# Patient Record
Sex: MALE | Race: WHITE | NOT HISPANIC OR LATINO | Employment: OTHER | URBAN - NONMETROPOLITAN AREA
[De-identification: names, ages, dates, MRNs, and addresses within clinical notes are randomized per-mention and may not be internally consistent; named-entity substitution may affect disease eponyms.]

---

## 2017-01-23 ENCOUNTER — OFFICE VISIT (OUTPATIENT)
Dept: OPHTHALMOLOGY | Facility: CLINIC | Age: 82
End: 2017-01-23

## 2017-01-23 DIAGNOSIS — H35.3190 NONEXUDATIVE AGE-RELATED MACULAR DEGENERATION: ICD-10-CM

## 2017-01-23 DIAGNOSIS — Z96.1 PSEUDOPHAKIA: Primary | ICD-10-CM

## 2017-01-23 DIAGNOSIS — H40.003 BORDERLINE GLAUCOMA, BILATERAL: ICD-10-CM

## 2017-01-23 PROBLEM — H40.009 BORDERLINE GLAUCOMA: Status: ACTIVE | Noted: 2017-01-23

## 2017-01-23 PROCEDURE — 92133 CPTRZD OPH DX IMG PST SGM ON: CPT | Performed by: OPHTHALMOLOGY

## 2017-01-23 PROCEDURE — 76514 ECHO EXAM OF EYE THICKNESS: CPT | Performed by: OPHTHALMOLOGY

## 2017-01-23 PROCEDURE — 92014 COMPRE OPH EXAM EST PT 1/>: CPT | Performed by: OPHTHALMOLOGY

## 2017-01-23 NOTE — PROGRESS NOTES
Subjective   Caio Funes is a 86 y.o. male.   Chief Complaint   Patient presents with   • Glaucoma Suspect   • Macular Degeneration   • Artificial lens Present     HPI     ? Sl decr vison       Last edited by Param Oliveira MD on 1/23/2017  9:10 AM.       Review of Systems    Objective   Visual Acuity (Snellen - Linear)      Right Left   Dist cc 20/40 -2 20/60 +2   Near sc J3 J2       Correction:  Glasses         Wearing Rx      Sphere Cylinder Axis Add   Right -2.25 +3.00 164 +2.50   Left -3.25 +3.25 001 +2.50           Manifest Refraction      Sphere Cylinder Axis Dist   Right -2.00 +2.75 165 20/40   Left -3.75 +3.75 005 20/40               Pupils      Pupils   Right PERRL   Left PERRL           Confrontational Visual Fields     Visual Fields      Left Right   Result Full                   Extraocular Movement      Right Left   Result Full Full              Tonometry (Applanation, 9:11 AM)      Right Left   Pressure 20 23              Main Ophthalmology Exam     External Exam      Right Left    External Normal Normal      Slit Lamp Exam      Right Left    Lids/Lashes Normal Normal    Conjunctiva/Sclera White and quiet White and quiet    Cornea Pterygium 1.6mm nasal Clear    Anterior Chamber Deep and quiet Deep and quiet    Iris Round and reactive Round and reactive    Lens Posterior chamber intraocular lens Posterior chamber intraocular lens    Vitreous Normal Normal      Fundus Exam      Right Left    Disc Peripapillary atrophy, Thin rim 0.2 ST, IT Peripapillary atrophy, Thin rim 0.2 St, IT    Macula Early age related macular degeneration, no drusen Early age related macular degeneration, no drusen    Vessels Normal Normal    Periphery Normal Normal            OCT Disc:   OD- normal  OS- normal      Assessment/Plan   Caio was seen today for glaucoma suspect, macular degeneration and artificial lens present.    Diagnoses and all orders for this visit:    Pseudophakia    Nonexudative age-related  macular degeneration    Borderline glaucoma, bilateral      High antioxidant foods +/- AREDS 2 supplement  Amsler grid daily, call if changes occur.  Consider RX chng, pt not interested  Follow up 1 year or prn

## 2017-01-23 NOTE — MR AVS SNAPSHOT
Caio Funes   2017 8:30 AM   Office Visit    Dept Phone:  [phone]   Encounter #:  [id number]    Provider:  Param Oliveira MD   Department:  [facility] OPHTHALMOLOGY                Your Full Care Plan              Your Updated Medication List          This list is accurate as of: 17  9:53 AM.  Always use your most recent med list.                aspirin 81 MG EC tablet       Calcium Carbonate-Vitamin D3 600-400 MG-UNIT tablet       PRESERVISION/LUTEIN capsule               You Were Diagnosed With        Codes Comments    Pseudophakia    -  Primary ICD-10-CM: Z96.1  ICD-9-CM: V43.1     Nonexudative age-related macular degeneration     ICD-10-CM: H35.3190  ICD-9-CM: 362.51     Borderline glaucoma, bilateral     ICD-10-CM: H40.003  ICD-9-CM: 365.00       Instructions     None    Patient Instructions History      Upcoming Appointments     Visit Type Date Time Department    OFFICE VISIT 2017  8:30 AM [facility] OPHTHALMOLOGY [facility]      [employer] Signup     [facility] [employer] allows you to send messages to your doctor, view your test results, renew your prescriptions, schedule appointments, and more. To sign up, go to [employer] and click on the Sign Up Now link in the New User? box. Enter your [employer] Activation Code exactly as it appears below along with the last four digits of your Social Security Number and your Date of Birth () to complete the sign-up process. If you do not sign up before the expiration date, you must request a new code.    [employer] Activation Code: [email or web address]  Expires: 2017  9:52 AM    If you have questions, you can email [email or web address] or call [phone] to talk to our [employer] staff. Remember, [employer] is NOT to be used for urgent needs. For medical emergencies, dial 911.               Other Info from Your Visit           Your Appointments     2018  9:00 AM [facility]   Office Visit  with Param Oliveira MD   [facility] OPHTHALMOLOGY (--)    [address] [place] [address] [address]  [facility] [address] [place] [id number] [phone]           Arrive 15 minutes prior to appointment.              Allergies     No Known Allergies      Reason for Visit     Glaucoma Suspect     Macular Degeneration     Artificial lens Present           Vital Signs     Smoking Status                   Never Smoker           Problems and Diagnoses Noted     Borderline glaucoma    Dry senile macular degeneration    Pseudophakia

## 2018-02-28 ENCOUNTER — HOSPITAL ENCOUNTER (OUTPATIENT)
Dept: NUCLEAR MEDICINE | Facility: HOSPITAL | Age: 83
Discharge: HOME | End: 2018-02-28

## 2018-02-28 DIAGNOSIS — K82.9 GALLBLADDER PAIN: ICD-10-CM

## 2018-02-28 PROCEDURE — A9537 TC99M MEBROFENIN: HCPCS | Performed by: FAMILY MEDICINE

## 2018-02-28 PROCEDURE — 25010000002 SINCALIDE PER 5 MCG: Performed by: FAMILY MEDICINE

## 2018-02-28 PROCEDURE — 0 TECHNETIUM TC 99M MEBROFENIN KIT: Performed by: FAMILY MEDICINE

## 2018-02-28 PROCEDURE — 78227 HEPATOBIL SYST IMAGE W/DRUG: CPT

## 2018-02-28 RX ORDER — KIT FOR THE PREPARATION OF TECHNETIUM TC 99M MEBROFENIN 45 MG/10ML
1 INJECTION, POWDER, LYOPHILIZED, FOR SOLUTION INTRAVENOUS
Status: COMPLETED | OUTPATIENT
Start: 2018-02-28 | End: 2018-02-28

## 2018-02-28 RX ORDER — SODIUM CHLORIDE 9 MG/ML
50 INJECTION, SOLUTION INTRAVENOUS ONCE
Status: COMPLETED | OUTPATIENT
Start: 2018-02-28 | End: 2018-02-28

## 2018-02-28 RX ADMIN — SINCALIDE 1.3 MCG: 5 INJECTION, POWDER, LYOPHILIZED, FOR SOLUTION INTRAVENOUS at 10:15

## 2018-02-28 RX ADMIN — MEBROFENIN 1 DOSE: 45 INJECTION, POWDER, LYOPHILIZED, FOR SOLUTION INTRAVENOUS at 09:08

## 2018-02-28 RX ADMIN — SODIUM CHLORIDE 50 ML/HR: 900 INJECTION, SOLUTION INTRAVENOUS at 10:15
